# Patient Record
Sex: MALE | Race: WHITE | Employment: FULL TIME | URBAN - NONMETROPOLITAN AREA
[De-identification: names, ages, dates, MRNs, and addresses within clinical notes are randomized per-mention and may not be internally consistent; named-entity substitution may affect disease eponyms.]

---

## 2021-04-13 ENCOUNTER — OFFICE VISIT (OUTPATIENT)
Dept: PRIMARY CARE CLINIC | Age: 56
End: 2021-04-13
Payer: COMMERCIAL

## 2021-04-13 VITALS
HEIGHT: 69 IN | SYSTOLIC BLOOD PRESSURE: 140 MMHG | BODY MASS INDEX: 30.21 KG/M2 | RESPIRATION RATE: 16 BRPM | HEART RATE: 97 BPM | DIASTOLIC BLOOD PRESSURE: 100 MMHG | OXYGEN SATURATION: 92 % | TEMPERATURE: 98.7 F | WEIGHT: 204 LBS

## 2021-04-13 DIAGNOSIS — Z00.00 WELLNESS EXAMINATION: ICD-10-CM

## 2021-04-13 DIAGNOSIS — Z00.00 WELLNESS EXAMINATION: Primary | ICD-10-CM

## 2021-04-13 PROCEDURE — 99386 PREV VISIT NEW AGE 40-64: CPT | Performed by: FAMILY MEDICINE

## 2021-04-13 RX ORDER — LOPERAMIDE HYDROCHLORIDE 2 MG/1
CAPSULE ORAL
COMMUNITY
Start: 2021-03-23 | End: 2021-07-09

## 2021-04-13 RX ORDER — CHOLESTYRAMINE 4 G/9G
4 POWDER, FOR SUSPENSION ORAL 2 TIMES DAILY
COMMUNITY
Start: 2021-03-23 | End: 2021-07-09

## 2021-04-13 ASSESSMENT — PATIENT HEALTH QUESTIONNAIRE - PHQ9
SUM OF ALL RESPONSES TO PHQ QUESTIONS 1-9: 0
2. FEELING DOWN, DEPRESSED OR HOPELESS: 0
SUM OF ALL RESPONSES TO PHQ9 QUESTIONS 1 & 2: 0
SUM OF ALL RESPONSES TO PHQ QUESTIONS 1-9: 0

## 2021-04-13 NOTE — PROGRESS NOTES
2021     Meliton Gonzalez    : 1965 Sex: male   Age: 54 y.o. Chief Complaint   Patient presents with    Establish Care       HPI: This 54y.o. -year-old male  presents today to establish care as a new patient. Past medical history includes lower GI bleed secondary to diverticular disease. The patient is status post colon surgery. Social history is negative for current tobacco, alcohol or drug use or abuse. Family history is significant for COPD, diabetes and congestive heart failure. Current medication list reviewed. The patient is tolerating all medications well without adverse events or known side effects. The patient does understand the risk and benefits of the prescribed medications. The patient is not up-to-date on all age-appropriate wellness issues. ROS:   Const: Denies changes in appetite, chills, fever, night sweats and weight loss. Eyes:  Denies discharge, a recent change in visual acuity, blurred vision and double vision. ENMT: Denies discharge of the ears, hearing loss, pain of the ears. Denies nasal or sinus symptoms other than stated above. Denies mouth or throat symptoms. CV:  Denies chest pain, dyspnea on exertion, orthopnea, palpitations and PND  Resp: Denies chest pain, cough, SOB and wheezing. GI: Denies abdominal pain, constipation, diarrhea, heartburn, indigestion, nausea and vomiting. : Denies dysuria, frequency, hematuria, nocturia and urgency. Musculo: Denies arthralgias and myalgia  Skin:  Denies lesions, pruritus and rash. Neuro: Denies dizziness, lightheadedness, numbness, tingling and weakness. Psych:  Denies anxiety and depression  Endocrine: Denies polyuria, polydipsia, polyphagia, weight gain, dry skin, constipation, fatigue, cold intolerance, heat intolerance or tremors. Hema/Lymph: Denies hematologic symptoms  Allergy/Immuno:  Denies allergic/immunologic symptoms.   Pertinent positives reviewed and noted      Current Outpatient Medications:    ferrous sulfate (SLOW FE) 142 (45 Fe) MG extended release tablet, Take 45 mg by mouth daily, Disp: , Rfl:     loperamide (IMODIUM) 2 MG capsule, Loperamide Loperamide Hcl (Imodium) 2 MG Capsule Active 2 MG PO Q8H 2021 1:26pm 2021  [facility] ([id number]), Disp: , Rfl:     cholestyramine (QUESTRAN) 4 g packet, Take 4 g by mouth 2 times daily, Disp: , Rfl:     No Known Allergies    History reviewed. No pertinent past medical history. Social History     Socioeconomic History    Marital status:      Spouse name: Not on file    Number of children: Not on file    Years of education: Not on file    Highest education level: Not on file   Occupational History    Not on file   Social Needs    Financial resource strain: Not on file    Food insecurity     Worry: Not on file     Inability: Not on file    Transportation needs     Medical: Not on file     Non-medical: Not on file   Tobacco Use    Smoking status: Former Smoker     Years: 10.00     Types: Cigarettes     Quit date: 2005     Years since quittin.2    Smokeless tobacco: Never Used   Substance and Sexual Activity    Alcohol use: Not Currently    Drug use: Never    Sexual activity: Yes     Partners: Female   Lifestyle    Physical activity     Days per week: Not on file     Minutes per session: Not on file    Stress: Not on file   Relationships    Social connections     Talks on phone: Not on file     Gets together: Not on file     Attends Congregation service: Not on file     Active member of club or organization: Not on file     Attends meetings of clubs or organizations: Not on file     Relationship status: Not on file    Intimate partner violence     Fear of current or ex partner: Not on file     Emotionally abused: Not on file     Physically abused: Not on file     Forced sexual activity: Not on file   Other Topics Concern    Not on file   Social History Narrative    Not on file     History reviewed. No pertinent surgical history. History reviewed. No pertinent family history. Vitals:    04/13/21 1056   BP: (!) 180/120   Pulse: 97   Resp: 16   Temp: 98.7 °F (37.1 °C)   SpO2: 92%   Weight: 204 lb (92.5 kg)   Height: 5' 9\" (1.753 m)        Exam: Const: Appears healthy and well developed. No signs of acute distress present. Eyes: PERRL  ENMT: Tympanic membranes are intact. Nasal mucosa intact without noted erythema Septum is in the midline. Posterior pharynx shows no exudate, irritation or redness. Neck:  Supple without adenopathy. Adequate range of motion   Resp: No rales, rhonchi, wheezes appreciated over the lungs bilaterally. CV: S1, S2 within normal limits. Regular rate and rhythm noted. Without murmur, gallop or rub. Extremities:  Pulses intact. Without noted edema. Abdomen: Positive bowel sounds. Palpation reveals softness, with no distension, organomegaly or tenderness. No abdominal masses palpable. Skin: Skin is warm and dry. Musculo: Unremarkable upon examination. Neuro: Alert and oriented X3. Cranial nerves grossly intact. Psych: Mood is normal.  Affect is normal.   Vital signs reviewed. Controlled Substances Monitoring:     No flowsheet data found. Plan Per Assessment:  Shanon Pleitez was seen today for establish care. Diagnoses and all orders for this visit:    Wellness examination  -     Comprehensive Metabolic Panel; Future  -     Lipid Panel; Future  -     PSA, Total and Free; Future      Release of records. Repeat blood pressure. Return in about 1 year (around 4/13/2022) for Annual exam.    Liudmila Schreiber MD    Note was generated with the assistance of voice recognition software. Document was reviewed however may contain grammatical errors.

## 2021-06-25 ENCOUNTER — TELEPHONE (OUTPATIENT)
Dept: ADMINISTRATIVE | Age: 56
End: 2021-06-25

## 2021-06-25 NOTE — TELEPHONE ENCOUNTER
Won pt- thinks had a gallbladder attack last week- would like to establish with AMIRAH Del Castillo. His wife Ihsan Solis is your patient. He needs a Friday am appointment do to work schedule. Please advise if you can accept.  Thanks

## 2021-07-09 ENCOUNTER — OFFICE VISIT (OUTPATIENT)
Dept: PRIMARY CARE CLINIC | Age: 56
End: 2021-07-09
Payer: COMMERCIAL

## 2021-07-09 VITALS
BODY MASS INDEX: 30.21 KG/M2 | TEMPERATURE: 98.3 F | RESPIRATION RATE: 18 BRPM | HEIGHT: 69 IN | WEIGHT: 204 LBS | DIASTOLIC BLOOD PRESSURE: 102 MMHG | HEART RATE: 100 BPM | SYSTOLIC BLOOD PRESSURE: 178 MMHG | OXYGEN SATURATION: 97 %

## 2021-07-09 DIAGNOSIS — I10 ESSENTIAL HYPERTENSION: ICD-10-CM

## 2021-07-09 DIAGNOSIS — R10.11 RIGHT UPPER QUADRANT PAIN: Primary | ICD-10-CM

## 2021-07-09 PROBLEM — E66.9 OBESITY, UNSPECIFIED: Status: ACTIVE | Noted: 2021-07-09

## 2021-07-09 PROCEDURE — 99214 OFFICE O/P EST MOD 30 MIN: CPT | Performed by: NURSE PRACTITIONER

## 2021-07-09 RX ORDER — LISINOPRIL 20 MG/1
20 TABLET ORAL DAILY
Qty: 30 TABLET | Refills: 5 | Status: SHIPPED
Start: 2021-07-09 | End: 2021-08-06

## 2021-07-09 SDOH — ECONOMIC STABILITY: FOOD INSECURITY: WITHIN THE PAST 12 MONTHS, THE FOOD YOU BOUGHT JUST DIDN'T LAST AND YOU DIDN'T HAVE MONEY TO GET MORE.: NEVER TRUE

## 2021-07-09 SDOH — ECONOMIC STABILITY: FOOD INSECURITY: WITHIN THE PAST 12 MONTHS, YOU WORRIED THAT YOUR FOOD WOULD RUN OUT BEFORE YOU GOT MONEY TO BUY MORE.: NEVER TRUE

## 2021-07-09 ASSESSMENT — ENCOUNTER SYMPTOMS
DIARRHEA: 1
BACK PAIN: 1
RESPIRATORY NEGATIVE: 1

## 2021-07-09 ASSESSMENT — SOCIAL DETERMINANTS OF HEALTH (SDOH): HOW HARD IS IT FOR YOU TO PAY FOR THE VERY BASICS LIKE FOOD, HOUSING, MEDICAL CARE, AND HEATING?: NOT HARD AT ALL

## 2021-07-09 NOTE — PROGRESS NOTES
Subjective  CC: Patient presents to establish. The patient was a previous patient of Dr. Tawanna Hills, he was only seen once. The patient did not have a PCP prior. HPI: This is a patient who is new to my practice. In March of this year, the patient was hospitalized with a GI bleed. This was determined to be caused by a bleeding diverticuli, and the patient required partial colectomy. He did have colonoscopy and EGD completed due to the GI bleeding. He required multiple units of blood. He then established with Dr. Tawanna Hills. The patient has not had any lab work completed since hospitalization including blood cell counts. He states he feels well. He has 2 primary complaints today. First of all, his blood pressure was elevated when he was hospitalized, and then at his follow-up visit. He states he has been checking these at home and it did decrease for a short time after his last office visit, but it has primarily been significantly elevated. He is asymptomatic in this regard. He also states that a few weeks ago, he had what he thinks is a gallbladder attack. He had pain in the right upper quadrant lasting for several hours at night. He denies any fevers, chills, nausea, vomiting. He had an episode like this in the fall of last year, but he states this resolved much more quickly. I reviewed the CT scan completed during his hospitalization of the abdomen which did note gallstones in the gallbladder. At this point in time, the patient does not have any pain. ROS:  Review of Systems   Constitutional: Negative. HENT: Negative. Eyes:        Wears glasses   Respiratory: Negative. Cardiovascular:        Hypertension   Gastrointestinal: Positive for diarrhea. As above   Genitourinary:        Nocturia x 3-4 times/night   Musculoskeletal: Positive for back pain. Skin: Negative. Neurological: Negative. Hematological: Negative. Psychiatric/Behavioral: Negative.            Current Outpatient Medications:     Multiple Vitamin (MULTIVITAMIN ADULT PO), Take by mouth, Disp: , Rfl:     lisinopril (PRINIVIL;ZESTRIL) 20 MG tablet, Take 1 tablet by mouth daily, Disp: 30 tablet, Rfl: 5   No Known Allergies     PMH:  No past medical history on file. Objective  Vitals:    07/09/21 0855 07/09/21 0932   BP: (!) 178/108 (!) 178/102   Site: Left Upper Arm    Position: Sitting    Cuff Size: Medium Adult    Pulse: 100    Resp: 18    Temp: 98.3 °F (36.8 °C)    TempSrc: Temporal    SpO2: 97%    Weight: 204 lb (92.5 kg)    Height: 5' 9\" (1.753 m)       Exam:  Const: Appears healthy, well developed and well nourished. Appears to be overweight. Eyes: EOMI in both eyes. PERRL. ENMT: External ears WNL. Tympanic membranes are intact. Septum is in the midline. Posterior  pharynx shows no exudate, irritation or redness. Neck: Supple and symmetric. Palpation reveals no adenopathy. No masses appreciated. Thyroid  exhibits no nodule or thyromegaly. No JVD. Resp: Respirations are unlabored. Respiration rate is normal. Auscultate good airflow. No rales,  rhonchi or wheezes appreciated over the lungs bilaterally. Abdomen: BS x 4 quadrants. Abdomen soft, round, nontender. No organomegaly noted. CV: Rhythm is regular. S1 is normal. S2 is normal. Carotids: no bruits. Extremities: No clubbing or cyanosis. Musculo: Walks with a normal gait. Upper Extremities: Full ROM bilaterally. Lower Extremities: Full  ROM bilaterally. Skin: Dry and warm with no rash. Neuro: Alert and oriented x3. Mood is normal. Affect is normal. Speech is articulate and fluent. Corey Merritt was seen today for establish care, hypertension and abdominal pain. Diagnoses and all orders for this visit:    Right upper quadrant pain  -     US GALLBLADDER RUQ; Future    Essential hypertension  -     lisinopril (PRINIVIL;ZESTRIL) 20 MG tablet;  Take 1 tablet by mouth daily       Care Plan:  We discussed conservative measures for hypertension including increased exercise, low-salt diet, weight loss. The patient states he is not really motivated to do these things. At this point in time, I recommend pharmacotherapy due to the microvascular and macrovascular complications. The patient is hesitant but is willing to do so. Lab work will need to be done at his next office visit including CBC, CMP, lipids and PSA. I am going to obtain an ultrasound of the right upper quadrant to get a better look at the gallbladder, recommend to avoid high-fat foods in the interim. Reviewed signs and symptoms that would warrant immediate evaluation.

## 2021-07-21 ENCOUNTER — TELEPHONE (OUTPATIENT)
Dept: PRIMARY CARE CLINIC | Age: 56
End: 2021-07-21

## 2021-08-06 ENCOUNTER — OFFICE VISIT (OUTPATIENT)
Dept: PRIMARY CARE CLINIC | Age: 56
End: 2021-08-06
Payer: COMMERCIAL

## 2021-08-06 VITALS
HEART RATE: 92 BPM | TEMPERATURE: 98.3 F | RESPIRATION RATE: 18 BRPM | OXYGEN SATURATION: 96 % | DIASTOLIC BLOOD PRESSURE: 98 MMHG | SYSTOLIC BLOOD PRESSURE: 182 MMHG | BODY MASS INDEX: 29.77 KG/M2 | HEIGHT: 69 IN | WEIGHT: 201 LBS

## 2021-08-06 DIAGNOSIS — K80.20 CALCULUS OF GALLBLADDER WITHOUT CHOLECYSTITIS WITHOUT OBSTRUCTION: ICD-10-CM

## 2021-08-06 DIAGNOSIS — I10 ESSENTIAL HYPERTENSION: Primary | ICD-10-CM

## 2021-08-06 DIAGNOSIS — Z12.5 PROSTATE CANCER SCREENING: ICD-10-CM

## 2021-08-06 PROCEDURE — 99213 OFFICE O/P EST LOW 20 MIN: CPT | Performed by: NURSE PRACTITIONER

## 2021-08-06 RX ORDER — AMLODIPINE BESYLATE 5 MG/1
5 TABLET ORAL DAILY
Qty: 30 TABLET | Refills: 3 | Status: SHIPPED
Start: 2021-08-06 | End: 2021-12-03 | Stop reason: SDUPTHER

## 2021-08-06 ASSESSMENT — ENCOUNTER SYMPTOMS
BACK PAIN: 1
DIARRHEA: 1
RESPIRATORY NEGATIVE: 1

## 2021-08-06 NOTE — PROGRESS NOTES
Subjective  CC: Patient presents to follow up. HPI: The patient states that he started lisinopril, and he was able to take this for just less than 2 weeks, but he was experiencing side effects including reflux symptomatology and tachycardia. These are not typical side effects, but the patient states they resolved once he discontinued the lisinopril. Unfortunately, his blood pressure remains elevated today. He is willing to trial another medication, he does state that he would be unwilling to trial a third, but we will cross that bridge when we get there. The patient is due for lab work today which will be completed. He had an ultrasound of the gallbladder completed which did confirm gallstones. The patient has not had any further gallbladder attacks, but he states he is really unwilling to follow a low-fat diet. His risk of a recurrent episode is likely pretty high. We discussed a surgical referral, Dr. Cary Chi did his partial colectomy earlier this year and he would like to be referred back. We did discuss the risk of cholecystitis if he does not elect for a cholecystectomy. ROS:  Review of Systems   Constitutional: Negative. HENT: Negative. Eyes:        Wears glasses   Respiratory: Negative. Cardiovascular:        Hypertension   Gastrointestinal: Positive for diarrhea. As above   Genitourinary:        Nocturia x 3-4 times/night   Musculoskeletal: Positive for back pain. Skin: Negative. Neurological: Negative. Hematological: Negative. Psychiatric/Behavioral: Negative. Current Outpatient Medications:     amLODIPine (NORVASC) 5 MG tablet, Take 1 tablet by mouth daily, Disp: 30 tablet, Rfl: 3    Multiple Vitamin (MULTIVITAMIN ADULT PO), Take by mouth, Disp: , Rfl:    No Known Allergies     PMH:  No past medical history on file.      Objective  Vitals:    08/06/21 0849 08/06/21 0915   BP: (!) 182/108 (!) 182/98   Site: Left Upper Arm    Position: Sitting    Cuff Size: Medium Adult    Pulse: 92    Resp: 18    Temp: 98.3 °F (36.8 °C)    TempSrc: Temporal    SpO2: 96%    Weight: 201 lb (91.2 kg)    Height: 5' 9\" (1.753 m)       Exam:  Const: Appears healthy, well developed and well nourished. Appears to be overweight. Eyes: EOMI in both eyes. PERRL. Neck: Supple and symmetric. Palpation reveals no adenopathy. No masses appreciated. Thyroid  exhibits no nodule or thyromegaly. No JVD. Resp: Respirations are unlabored. Respiration rate is normal. Auscultate good airflow. No rales,  rhonchi or wheezes appreciated over the lungs bilaterally. CV: Rhythm is regular. S1 is normal. S2 is normal. Carotids: no bruits. Extremities: No clubbing or cyanosis. Musculo: Walks with a normal gait. Upper Extremities: Full ROM bilaterally. Lower Extremities: Full  ROM bilaterally. Skin: Dry and warm with no rash. Neuro: Alert and oriented x3. Mood is normal. Affect is normal. Speech is articulate and fluent. Jelena Cruz was seen today for hypertension, follow-up and discuss medications. Diagnoses and all orders for this visit:    Essential hypertension  -     CBC Auto Differential; Future  -     Comprehensive Metabolic Panel; Future  -     Lipid Panel; Future  -     TSH without Reflex; Future  -     Urinalysis; Future  -     amLODIPine (NORVASC) 5 MG tablet; Take 1 tablet by mouth daily    Calculus of gallbladder without cholecystitis without obstruction  -     External Referral To General Surgery    Prostate cancer screening  -     PSA screening; Future       Care Plan:  Lab work will be obtained today. Norvasc will be started, we discussed the risks and side effects with the patient. He is to be seen back in a few weeks and we will recheck this. He is to notify me if he has any problems in the interim.

## 2021-08-27 ENCOUNTER — OFFICE VISIT (OUTPATIENT)
Dept: PRIMARY CARE CLINIC | Age: 56
End: 2021-08-27
Payer: COMMERCIAL

## 2021-08-27 VITALS
SYSTOLIC BLOOD PRESSURE: 156 MMHG | TEMPERATURE: 98.1 F | OXYGEN SATURATION: 95 % | BODY MASS INDEX: 29.77 KG/M2 | DIASTOLIC BLOOD PRESSURE: 92 MMHG | WEIGHT: 201 LBS | RESPIRATION RATE: 18 BRPM | HEIGHT: 69 IN | HEART RATE: 94 BPM

## 2021-08-27 DIAGNOSIS — I10 ESSENTIAL HYPERTENSION: Primary | ICD-10-CM

## 2021-08-27 PROCEDURE — 99213 OFFICE O/P EST LOW 20 MIN: CPT | Performed by: NURSE PRACTITIONER

## 2021-08-27 ASSESSMENT — ENCOUNTER SYMPTOMS
RESPIRATORY NEGATIVE: 1
DIARRHEA: 1
BACK PAIN: 1

## 2021-08-27 NOTE — PROGRESS NOTES
Subjective  CC: Patient presents to follow up. HPI: The patient was started on Norvasc at his last office visit. His blood pressure remains elevated here, but it is significantly improved from previous. He states he has been checking this at home, and his blood pressures are primarily controlled. I have asked him to continue to check these and write these results down, and to bring his blood pressure cuff in with him at his next office visit. He has tolerated the Norvasc well, he states he has felt slightly more fatigued, but he recognizes this as a longtime symptom, and is uncertain if it is related to the amlodipine. I would anticipate some mild fatigue given the drastic change in his blood pressure. The patient did see general surgery, he is to have cholecystectomy completed next week. The patient also reports he noticed a mass to the left testicle. General surgery did order an ultrasound, there is a moderate sized epididymal or testicular cyst.  Radiology recommendation was to follow this in 3 months, the patient has been referred to urology and is waiting to hear from their office about an appointment. ROS:  Review of Systems   Constitutional: Negative. HENT: Negative. Eyes:        Wears glasses   Respiratory: Negative. Cardiovascular:        Hypertension   Gastrointestinal: Positive for diarrhea. As above   Genitourinary:        Nocturia x 3-4 times/night   Musculoskeletal: Positive for back pain. Skin: Negative. Neurological: Negative. Hematological: Negative. Psychiatric/Behavioral: Negative. Current Outpatient Medications:     amLODIPine (NORVASC) 5 MG tablet, Take 1 tablet by mouth daily, Disp: 30 tablet, Rfl: 3    Multiple Vitamin (MULTIVITAMIN ADULT PO), Take by mouth, Disp: , Rfl:    No Known Allergies     PMH:  No past medical history on file.      Objective  Vitals:    08/27/21 1259 08/27/21 1323   BP: (!) 154/92 (!) 156/92   Site: Left Upper Arm

## 2021-12-03 ENCOUNTER — OFFICE VISIT (OUTPATIENT)
Dept: PRIMARY CARE CLINIC | Age: 56
End: 2021-12-03
Payer: COMMERCIAL

## 2021-12-03 VITALS
SYSTOLIC BLOOD PRESSURE: 142 MMHG | HEIGHT: 69 IN | DIASTOLIC BLOOD PRESSURE: 80 MMHG | HEART RATE: 97 BPM | OXYGEN SATURATION: 100 % | BODY MASS INDEX: 31.1 KG/M2 | TEMPERATURE: 97.7 F | RESPIRATION RATE: 16 BRPM | WEIGHT: 210 LBS

## 2021-12-03 DIAGNOSIS — I10 ESSENTIAL HYPERTENSION: ICD-10-CM

## 2021-12-03 PROCEDURE — 99213 OFFICE O/P EST LOW 20 MIN: CPT | Performed by: NURSE PRACTITIONER

## 2021-12-03 RX ORDER — AMLODIPINE BESYLATE 5 MG/1
5 TABLET ORAL DAILY
Qty: 90 TABLET | Refills: 1 | Status: SHIPPED
Start: 2021-12-03 | End: 2022-06-03 | Stop reason: SDUPTHER

## 2021-12-03 ASSESSMENT — ENCOUNTER SYMPTOMS
BACK PAIN: 1
DIARRHEA: 1
RESPIRATORY NEGATIVE: 1

## 2021-12-03 NOTE — PROGRESS NOTES
Subjective  CC: Patient presents to follow up. HPI: The patient states he has been monitoring his blood pressures at home and these have been very well controlled. Typically 678 systolic. He has not had any further problems with the Norvasc. I did discuss the implications of this with him. He has gained about 10 pounds since I last saw him. He did have a cholecystectomy, and also has followed with urology for the epididymal cyst.  He was recommended not to be concerned about this further. The patient tells me that for many years he has had an issue with turning his head to the side. He states if he turns in a certain way he gets a ringing in his ears and feels that he potentially could pass out. ROS:  Review of Systems   Constitutional: Negative. HENT: Negative. Eyes:        Wears glasses   Respiratory: Negative. Cardiovascular:        Hypertension   Gastrointestinal: Positive for diarrhea. As above   Genitourinary:        Nocturia x 3-4 times/night   Musculoskeletal: Positive for back pain. Skin: Negative. Neurological: Negative. Hematological: Negative. Psychiatric/Behavioral: Negative. Current Outpatient Medications:     amLODIPine (NORVASC) 5 MG tablet, Take 1 tablet by mouth daily, Disp: 90 tablet, Rfl: 1    Multiple Vitamin (MULTIVITAMIN ADULT PO), Take by mouth, Disp: , Rfl:    No Known Allergies     PMH:  No past medical history on file. Objective  Vitals:    12/03/21 0849 12/03/21 0909   BP: (!) 142/80 (!) 142/80   Pulse: 97    Resp: 16    Temp: 97.7 °F (36.5 °C)    SpO2: 100%    Weight: 210 lb (95.3 kg)    Height: 5' 9\" (1.753 m)       Exam:  Const: Appears healthy, well developed and well nourished. Appears to be overweight. Eyes: EOMI in both eyes. PERRL. Resp: Respirations are unlabored. Respiration rate is normal. Auscultate good airflow. No rales,  rhonchi or wheezes appreciated over the lungs bilaterally. CV: Rhythm is regular.  S1 is normal. S2 is normal. Carotids: no bruits. Extremities: No clubbing or cyanosis. Musculo: Walks with a normal gait. Upper Extremities: Full ROM bilaterally. Lower Extremities: Full  ROM bilaterally. Full range of motion of the cervical spine. Skin: Dry and warm with no rash. Neuro: Alert and oriented x3. Mood is normal. Affect is normal. Speech is articulate and fluent. Valeria Roth was seen today for hypertension. Diagnoses and all orders for this visit:    Essential hypertension  -     amLODIPine (NORVASC) 5 MG tablet; Take 1 tablet by mouth daily       Care Plan:  Patient's home blood pressure readings are very well controlled. I will make no further changes at this time. I recommend that he have evaluation by ENT for what sounds like potential Ménière's disease. I will plan to see him back in 6 months.

## 2022-06-03 ENCOUNTER — OFFICE VISIT (OUTPATIENT)
Dept: PRIMARY CARE CLINIC | Age: 57
End: 2022-06-03
Payer: COMMERCIAL

## 2022-06-03 VITALS
HEIGHT: 69 IN | SYSTOLIC BLOOD PRESSURE: 168 MMHG | TEMPERATURE: 98.7 F | HEART RATE: 55 BPM | BODY MASS INDEX: 33.47 KG/M2 | WEIGHT: 226 LBS | DIASTOLIC BLOOD PRESSURE: 102 MMHG | OXYGEN SATURATION: 94 %

## 2022-06-03 DIAGNOSIS — E66.09 CLASS 1 OBESITY DUE TO EXCESS CALORIES WITHOUT SERIOUS COMORBIDITY WITH BODY MASS INDEX (BMI) OF 33.0 TO 33.9 IN ADULT: Primary | ICD-10-CM

## 2022-06-03 DIAGNOSIS — I10 ESSENTIAL HYPERTENSION: ICD-10-CM

## 2022-06-03 LAB
ALBUMIN SERPL-MCNC: 4.6 G/DL (ref 3.5–5.2)
ALP BLD-CCNC: 61 U/L (ref 40–129)
ALT SERPL-CCNC: 20 U/L (ref 0–40)
ANION GAP SERPL CALCULATED.3IONS-SCNC: 11 MMOL/L (ref 7–16)
AST SERPL-CCNC: 20 U/L (ref 0–39)
BILIRUB SERPL-MCNC: 0.7 MG/DL (ref 0–1.2)
BUN BLDV-MCNC: 12 MG/DL (ref 6–20)
CALCIUM SERPL-MCNC: 9.4 MG/DL (ref 8.6–10.2)
CHLORIDE BLD-SCNC: 103 MMOL/L (ref 98–107)
CHOLESTEROL, TOTAL: 217 MG/DL (ref 0–199)
CO2: 26 MMOL/L (ref 22–29)
CREAT SERPL-MCNC: 0.7 MG/DL (ref 0.7–1.2)
GFR AFRICAN AMERICAN: >60
GFR NON-AFRICAN AMERICAN: >60 ML/MIN/1.73
GLUCOSE BLD-MCNC: 102 MG/DL (ref 74–99)
HDLC SERPL-MCNC: 46 MG/DL
LDL CHOLESTEROL CALCULATED: 129 MG/DL (ref 0–99)
POTASSIUM SERPL-SCNC: 4.1 MMOL/L (ref 3.5–5)
SODIUM BLD-SCNC: 140 MMOL/L (ref 132–146)
TOTAL PROTEIN: 7.7 G/DL (ref 6.4–8.3)
TRIGL SERPL-MCNC: 211 MG/DL (ref 0–149)
TSH SERPL DL<=0.05 MIU/L-ACNC: 2 UIU/ML (ref 0.27–4.2)
VLDLC SERPL CALC-MCNC: 42 MG/DL

## 2022-06-03 PROCEDURE — 99213 OFFICE O/P EST LOW 20 MIN: CPT | Performed by: NURSE PRACTITIONER

## 2022-06-03 RX ORDER — AMLODIPINE BESYLATE 10 MG/1
10 TABLET ORAL DAILY
Qty: 30 TABLET | Refills: 5 | Status: SHIPPED
Start: 2022-06-03 | End: 2022-07-01

## 2022-06-03 SDOH — ECONOMIC STABILITY: FOOD INSECURITY: WITHIN THE PAST 12 MONTHS, THE FOOD YOU BOUGHT JUST DIDN'T LAST AND YOU DIDN'T HAVE MONEY TO GET MORE.: NEVER TRUE

## 2022-06-03 SDOH — SOCIAL STABILITY: SOCIAL INSECURITY: WITHIN THE LAST YEAR, HAVE YOU BEEN AFRAID OF YOUR PARTNER OR EX-PARTNER?: NO

## 2022-06-03 SDOH — SOCIAL STABILITY: SOCIAL INSECURITY: WITHIN THE LAST YEAR, HAVE YOU BEEN HUMILIATED OR EMOTIONALLY ABUSED IN OTHER WAYS BY YOUR PARTNER OR EX-PARTNER?: NO

## 2022-06-03 SDOH — ECONOMIC STABILITY: HOUSING INSECURITY
IN THE LAST 12 MONTHS, WAS THERE A TIME WHEN YOU DID NOT HAVE A STEADY PLACE TO SLEEP OR SLEPT IN A SHELTER (INCLUDING NOW)?: NO

## 2022-06-03 SDOH — ECONOMIC STABILITY: INCOME INSECURITY: HOW HARD IS IT FOR YOU TO PAY FOR THE VERY BASICS LIKE FOOD, HOUSING, MEDICAL CARE, AND HEATING?: NOT HARD AT ALL

## 2022-06-03 SDOH — ECONOMIC STABILITY: TRANSPORTATION INSECURITY
IN THE PAST 12 MONTHS, HAS LACK OF TRANSPORTATION KEPT YOU FROM MEETINGS, WORK, OR FROM GETTING THINGS NEEDED FOR DAILY LIVING?: NO

## 2022-06-03 SDOH — SOCIAL STABILITY: SOCIAL NETWORK
DO YOU BELONG TO ANY CLUBS OR ORGANIZATIONS SUCH AS CHURCH GROUPS UNIONS, FRATERNAL OR ATHLETIC GROUPS, OR SCHOOL GROUPS?: NO

## 2022-06-03 SDOH — ECONOMIC STABILITY: TRANSPORTATION INSECURITY
IN THE PAST 12 MONTHS, HAS THE LACK OF TRANSPORTATION KEPT YOU FROM MEDICAL APPOINTMENTS OR FROM GETTING MEDICATIONS?: NO

## 2022-06-03 SDOH — HEALTH STABILITY: MENTAL HEALTH: HOW OFTEN DO YOU HAVE A DRINK CONTAINING ALCOHOL?: NEVER

## 2022-06-03 SDOH — SOCIAL STABILITY: SOCIAL NETWORK: IN A TYPICAL WEEK, HOW MANY TIMES DO YOU TALK ON THE PHONE WITH FAMILY, FRIENDS, OR NEIGHBORS?: NEVER

## 2022-06-03 SDOH — SOCIAL STABILITY: SOCIAL NETWORK: ARE YOU MARRIED, WIDOWED, DIVORCED, SEPARATED, NEVER MARRIED, OR LIVING WITH A PARTNER?: MARRIED

## 2022-06-03 SDOH — SOCIAL STABILITY: SOCIAL INSECURITY
WITHIN THE LAST YEAR, HAVE TO BEEN RAPED OR FORCED TO HAVE ANY KIND OF SEXUAL ACTIVITY BY YOUR PARTNER OR EX-PARTNER?: NO

## 2022-06-03 SDOH — HEALTH STABILITY: MENTAL HEALTH
STRESS IS WHEN SOMEONE FEELS TENSE, NERVOUS, ANXIOUS, OR CAN'T SLEEP AT NIGHT BECAUSE THEIR MIND IS TROUBLED. HOW STRESSED ARE YOU?: NOT AT ALL

## 2022-06-03 SDOH — SOCIAL STABILITY: SOCIAL INSECURITY
WITHIN THE LAST YEAR, HAVE YOU BEEN KICKED, HIT, SLAPPED, OR OTHERWISE PHYSICALLY HURT BY YOUR PARTNER OR EX-PARTNER?: NO

## 2022-06-03 SDOH — HEALTH STABILITY: PHYSICAL HEALTH: ON AVERAGE, HOW MANY MINUTES DO YOU ENGAGE IN EXERCISE AT THIS LEVEL?: 0 MIN

## 2022-06-03 SDOH — SOCIAL STABILITY: SOCIAL NETWORK: HOW OFTEN DO YOU ATTENT MEETINGS OF THE CLUB OR ORGANIZATION YOU BELONG TO?: NEVER

## 2022-06-03 SDOH — ECONOMIC STABILITY: HOUSING INSECURITY: IN THE LAST 12 MONTHS, HOW MANY PLACES HAVE YOU LIVED?: 1

## 2022-06-03 SDOH — HEALTH STABILITY: PHYSICAL HEALTH: ON AVERAGE, HOW MANY DAYS PER WEEK DO YOU ENGAGE IN MODERATE TO STRENUOUS EXERCISE (LIKE A BRISK WALK)?: 0 DAYS

## 2022-06-03 SDOH — SOCIAL STABILITY: SOCIAL NETWORK: HOW OFTEN DO YOU GET TOGETHER WITH FRIENDS OR RELATIVES?: NEVER

## 2022-06-03 SDOH — SOCIAL STABILITY: SOCIAL NETWORK: HOW OFTEN DO YOU ATTEND CHURCH OR RELIGIOUS SERVICES?: NEVER

## 2022-06-03 SDOH — ECONOMIC STABILITY: FOOD INSECURITY: WITHIN THE PAST 12 MONTHS, YOU WORRIED THAT YOUR FOOD WOULD RUN OUT BEFORE YOU GOT MONEY TO BUY MORE.: NEVER TRUE

## 2022-06-03 SDOH — ECONOMIC STABILITY: INCOME INSECURITY: IN THE LAST 12 MONTHS, WAS THERE A TIME WHEN YOU WERE NOT ABLE TO PAY THE MORTGAGE OR RENT ON TIME?: NO

## 2022-06-03 ASSESSMENT — ENCOUNTER SYMPTOMS
RESPIRATORY NEGATIVE: 1
DIARRHEA: 1
BACK PAIN: 1

## 2022-06-03 ASSESSMENT — PATIENT HEALTH QUESTIONNAIRE - PHQ9
SUM OF ALL RESPONSES TO PHQ9 QUESTIONS 1 & 2: 0
2. FEELING DOWN, DEPRESSED OR HOPELESS: 0
1. LITTLE INTEREST OR PLEASURE IN DOING THINGS: 0
SUM OF ALL RESPONSES TO PHQ QUESTIONS 1-9: 0

## 2022-06-03 NOTE — PROGRESS NOTES
Subjective  CC: Patient presents to follow up. HPI: The patient presents today with elevated blood pressures. He brought his home monitor, which does appear to be accurate. At home, his readings are under much better control. Typically in the low 881 systolic, or upper 401 range. He is asymptomatic in regards to hypertension. He is currently taking Norvasc 5 mg and this will be increased to 10 mg. He has not tolerated ACE inhibitor's in the past.  The patient and I had discussed the sensation in his ears that he is having with turning his neck side to side and I had recommended ENT follow-up but he declines at this time. He is up-to-date on other preventative measures. The patient has unfortunately gained about 25 pounds since last August.    ROS:  Review of Systems   Constitutional: Negative. HENT: Negative. Eyes:        Wears glasses   Respiratory: Negative. Cardiovascular:        Hypertension   Gastrointestinal: Positive for diarrhea. As above   Genitourinary:        Nocturia x 3-4 times/night   Musculoskeletal: Positive for back pain. Skin: Negative. Neurological: Negative. Hematological: Negative. Psychiatric/Behavioral: Negative. Current Outpatient Medications:     amLODIPine (NORVASC) 10 MG tablet, Take 1 tablet by mouth daily, Disp: 30 tablet, Rfl: 5    Multiple Vitamin (MULTIVITAMIN ADULT PO), Take by mouth, Disp: , Rfl:    No Known Allergies     PMH:  No past medical history on file. Objective  Vitals:    06/03/22 0826 06/03/22 0835   BP: (!) 172/96 (!) 168/102   Pulse: 55    Temp: 98.7 °F (37.1 °C)    TempSrc: Temporal    SpO2: 94%    Weight: 226 lb (102.5 kg)    Height: 5' 9\" (1.753 m)       Exam:  Const: Appears healthy, well developed and well nourished. Appears to be overweight. Eyes: EOMI in both eyes. PERRL.  ENT: Tympanic membranes are pearly gray with good light reflex bilaterally. Resp: Respirations are unlabored.  Respiration rate is normal. Auscultate good airflow. No rales,  rhonchi or wheezes appreciated over the lungs bilaterally. CV: Rhythm is regular. S1 is normal. S2 is normal. Carotids: no bruits. Extremities: No clubbing or cyanosis. Musculo: Walks with a normal gait. Upper Extremities: Full ROM bilaterally. Lower Extremities: Full  ROM bilaterally. Full range of motion of the cervical spine. Skin: Dry and warm with no rash. Neuro: Alert and oriented x3. Mood is normal. Affect is normal. Speech is articulate and fluent. Reyes Parkinson was seen today for hypertension. Diagnoses and all orders for this visit:    Class 1 obesity due to excess calories without serious comorbidity with body mass index (BMI) of 33.0 to 33.9 in adult    Essential hypertension  -     amLODIPine (NORVASC) 10 MG tablet; Take 1 tablet by mouth daily  -     Lipid Panel; Future  -     Comprehensive Metabolic Panel; Future  -     TSH; Future       Care Plan:  Lab work will be obtained today. Home readings are better than here but still not at goal.  Norvasc will be increased to 10 mg and the patient will return in about 2 weeks for a nurse visit for blood pressure recheck. He is to bring in his blood pressure cuff with him at that time. I will plan to see him back for an office visit in 6 months.

## 2022-06-08 ENCOUNTER — TELEPHONE (OUTPATIENT)
Dept: PRIMARY CARE CLINIC | Age: 57
End: 2022-06-08

## 2022-06-08 NOTE — TELEPHONE ENCOUNTER
Pt returning the call about his blood work. He is not interested in starting a statin medication at this time.

## 2022-07-01 ENCOUNTER — NURSE ONLY (OUTPATIENT)
Dept: PRIMARY CARE CLINIC | Age: 57
End: 2022-07-01

## 2022-07-01 VITALS — SYSTOLIC BLOOD PRESSURE: 160 MMHG | DIASTOLIC BLOOD PRESSURE: 90 MMHG

## 2022-07-01 NOTE — PROGRESS NOTES
Patient came in for blood pressure check today. Blood pressure remains significantly elevated. The patient states that after starting both the lisinopril and then the Norvasc, he had significant palpitations. He is not having them today during the visit. He refuses any other category of medication despite a discussion regarding the microvascular and macrovascular complications of uncontrolled hypertension. He is adamant that palpitations are related to medication use, I have told him that I feel this likely needs work-up but he declines. Heart rate is regular rhythm although slightly tachycardic today. I would consider propranolol as a blood pressure option. Patient will continue to monitor and will be seen back in November. If he continues to have palpitations more than 1 week after discontinuing antihypertension medications, I have told him it is no longer medication related and would need further work-up.

## 2022-08-03 ENCOUNTER — TELEPHONE (OUTPATIENT)
Dept: PRIMARY CARE CLINIC | Age: 57
End: 2022-08-03

## 2022-08-03 NOTE — TELEPHONE ENCOUNTER
Called pt to reschedule appointment from 11/25 due to [employer] being off, no answer, left message to return call.

## 2022-12-02 ENCOUNTER — OFFICE VISIT (OUTPATIENT)
Dept: PRIMARY CARE CLINIC | Age: 57
End: 2022-12-02
Payer: COMMERCIAL

## 2022-12-02 VITALS
BODY MASS INDEX: 33.92 KG/M2 | OXYGEN SATURATION: 98 % | HEIGHT: 69 IN | SYSTOLIC BLOOD PRESSURE: 128 MMHG | HEART RATE: 85 BPM | WEIGHT: 229 LBS | DIASTOLIC BLOOD PRESSURE: 80 MMHG | TEMPERATURE: 97.7 F

## 2022-12-02 DIAGNOSIS — E66.09 CLASS 1 OBESITY DUE TO EXCESS CALORIES WITHOUT SERIOUS COMORBIDITY WITH BODY MASS INDEX (BMI) OF 33.0 TO 33.9 IN ADULT: ICD-10-CM

## 2022-12-02 DIAGNOSIS — R73.09 ABNORMAL GLUCOSE: ICD-10-CM

## 2022-12-02 DIAGNOSIS — E78.2 MIXED HYPERLIPIDEMIA: ICD-10-CM

## 2022-12-02 DIAGNOSIS — Z12.5 PROSTATE CANCER SCREENING: ICD-10-CM

## 2022-12-02 DIAGNOSIS — I10 ESSENTIAL HYPERTENSION: Primary | ICD-10-CM

## 2022-12-02 PROCEDURE — 3074F SYST BP LT 130 MM HG: CPT | Performed by: NURSE PRACTITIONER

## 2022-12-02 PROCEDURE — 99214 OFFICE O/P EST MOD 30 MIN: CPT | Performed by: NURSE PRACTITIONER

## 2022-12-02 PROCEDURE — 3078F DIAST BP <80 MM HG: CPT | Performed by: NURSE PRACTITIONER

## 2022-12-02 RX ORDER — AMLODIPINE BESYLATE 10 MG/1
TABLET ORAL
COMMUNITY
Start: 2022-10-25 | End: 2022-12-02 | Stop reason: SDUPTHER

## 2022-12-02 RX ORDER — AMLODIPINE BESYLATE 10 MG/1
TABLET ORAL
Qty: 30 TABLET | Refills: 5 | Status: SHIPPED | OUTPATIENT
Start: 2022-12-02

## 2022-12-02 ASSESSMENT — ENCOUNTER SYMPTOMS
DIARRHEA: 1
RESPIRATORY NEGATIVE: 1
BACK PAIN: 1

## 2022-12-02 NOTE — PROGRESS NOTES
Subjective  CC: Patient presents to follow up. HPI: The patient is here to follow-up. Initial blood pressure is slightly elevated, but on repeat this is much improved. The patient states he has been taking his blood pressure readings at home intermittently, these have generally been well controlled. He does continue to have some palpitations but these are rare and he does not have any chest pain or shortness of breath. We discussed a Holter monitor if these persist or worsen in any way. He declines influenza vaccine. Unfortunately, he has not lost weight since his last office visit. We discussed the importance of this including his AHA risk score. The 10-year ASCVD risk score (Stanley SILVER, et al., 2019) is: 9.1%    Values used to calculate the score:      Age: 62 years      Sex: Male      Is Non- : No      Diabetic: No      Tobacco smoker: No      Systolic Blood Pressure: 270 mmHg      Is BP treated: Yes      HDL Cholesterol: 46 mg/dL      Total Cholesterol: 217 mg/dL      ROS:  Review of Systems   Constitutional: Negative. HENT: Negative. Eyes:         Wears glasses   Respiratory: Negative. Cardiovascular:         Hypertension   Gastrointestinal:  Positive for diarrhea. As above   Genitourinary:         Nocturia x 3-4 times/night   Musculoskeletal:  Positive for back pain. Skin: Negative. Neurological: Negative. Hematological: Negative. Psychiatric/Behavioral: Negative. Current Outpatient Medications:     amLODIPine (NORVASC) 10 MG tablet, TAKE ONE TABLET BY MOUTH EVERY DAY, Disp: 30 tablet, Rfl: 5    Multiple Vitamin (MULTIVITAMIN ADULT PO), Take by mouth, Disp: , Rfl:    No Known Allergies     PMH:  No past medical history on file.      Objective  Vitals:    12/02/22 0753 12/02/22 0804   BP: (!) 148/76 128/80   Pulse: 85    Temp: 97.7 °F (36.5 °C)    TempSrc: Temporal    SpO2: 98%    Weight: 229 lb (103.9 kg)    Height: 5' 9\" (1.753 m) Exam:  Const: Appears healthy, well developed and well nourished. Appears to be overweight. Eyes: EOMI in both eyes. PERRL. Resp: Respirations are unlabored. Respiration rate is normal. Auscultate good airflow. No rales,  rhonchi or wheezes appreciated over the lungs bilaterally. CV: Rhythm is regular. S1 is normal. S2 is normal. Carotids: no bruits. Extremities: No clubbing or cyanosis. Musculo: Walks with a normal gait. Upper Extremities: Full ROM bilaterally. Lower Extremities: Full  ROM bilaterally. Full range of motion of the cervical spine. Skin: Dry and warm with no rash. Neuro: Alert and oriented x3. Mood is normal. Affect is normal. Speech is articulate and fluent. Felecia Patterson was seen today for hypertension. Diagnoses and all orders for this visit:    Essential hypertension  -     CBC with Auto Differential; Future  -     TSH; Future  -     Urinalysis; Future    Class 1 obesity due to excess calories without serious comorbidity with body mass index (BMI) of 33.0 to 33.9 in adult    Mixed hyperlipidemia  -     CBC with Auto Differential; Future  -     Comprehensive Metabolic Panel; Future  -     TSH; Future  -     Lipid Panel; Future    Abnormal glucose  -     Hemoglobin A1C; Future    Prostate cancer screening  -     PSA Screening; Future    Other orders  -     amLODIPine (NORVASC) 10 MG tablet; TAKE ONE TABLET BY MOUTH EVERY DAY     Care Plan:  Patient reports he is only able to obtain lab work annually. This will be ordered for June of next year. He states he will be working on weight loss. I again reviewed the risks of MI or CVA with uncontrolled hyperlipidemia. Patient will continue to monitor blood pressures. I will see him back in June.

## 2023-06-08 ENCOUNTER — TELEPHONE (OUTPATIENT)
Dept: PRIMARY CARE CLINIC | Age: 58
End: 2023-06-08